# Patient Record
Sex: MALE | Race: WHITE
[De-identification: names, ages, dates, MRNs, and addresses within clinical notes are randomized per-mention and may not be internally consistent; named-entity substitution may affect disease eponyms.]

---

## 2022-12-31 ENCOUNTER — HOSPITAL ENCOUNTER (EMERGENCY)
Dept: HOSPITAL 75 - ER | Age: 57
Discharge: HOME | End: 2022-12-31
Payer: COMMERCIAL

## 2022-12-31 VITALS — WEIGHT: 220.46 LBS | HEIGHT: 74.02 IN | BODY MASS INDEX: 28.29 KG/M2

## 2022-12-31 VITALS — SYSTOLIC BLOOD PRESSURE: 148 MMHG | DIASTOLIC BLOOD PRESSURE: 106 MMHG
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
## 2022-12-31 DIAGNOSIS — S61.011A: Primary | ICD-10-CM

## 2022-12-31 DIAGNOSIS — Z28.310: ICD-10-CM

## 2022-12-31 DIAGNOSIS — Z23: ICD-10-CM
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
## 2022-12-31 PROCEDURE — 90471 IMMUNIZATION ADMIN: CPT

## 2022-12-31 PROCEDURE — 90715 TDAP VACCINE 7 YRS/> IM: CPT

## 2022-12-31 NOTE — ED UPPER EXTREMITY
General


Stated Complaint:  RIGHT THUMB LAC


Source:  patient


Exam Limitations:  no limitations





History of Present Illness


Date Seen by Provider:  Dec 31, 2022


Time Seen by Provider:  19:13


Initial Comments


56 y/o male presents tonight with c/o laceration to right thumb.  Pt states he 

accidentally cut himself while sharpening a knife at work.  He works at [employer].  Injury occurred just prior to arrival.  He denies any other injuries.

He is uncertain when his last Tdap vaccine was.


Onset:  just prior to arrival


Pain/Injury Location:  right thumb


Method of Injury:  other (sharping a knife)


Associated Symptoms:  denies





Allergies and Home Medications


Allergies


Coded Allergies:  


     No Known Drug Allergies (Unverified , 12/31/22)





Patient Home Medication List


Home Medication List Reviewed:  Yes





Review of Systems


Constitutional:  no symptoms reported


Respiratory:  no symptoms reported


Cardiovascular:  no symptoms reported


Musculoskeletal:  no symptoms reported


Skin:  other (right thumb laceration)


Psychiatric/Neurological:  No Symptoms Reported





Past Medical-Social-Family Hx


Patient Social History


Tobacco Use?:  No


Substance use?:  No


Alcohol Use?:  No





Physical Exam


Vital Signs


Capillary Refill :


Height, Weight, BMI


Height: '"


Weight: lbs. oz. kg;  BMI


Method:


General Appearance:  WD/WN, no apparent distress


Wrist:  Yes normal inspection, Yes non-tender, Yes no evidence of injury


Hand:  non-tender, normal ROM, laceration (below cuticle of right thumb,1.5cm C 

shaped)


Neurologic/Tendon:  normal sensation, normal motor functions, normal tendon 

functions, responds to pain, no evidence tendon injury


Neurologic/Psychiatric:  no motor/sensory deficits, alert, normal mood/affect, 

oriented x 3


Skin:  normal color, warm/dry





Procedures/Interventions





   Wound Location:  Upper Extremities (right thumb)


   Wound Length (cm):  1.5


   Wound's Depth, Shape:  superficial, flap


   Wound Explored:  clean


   Irrigated w/ Saline (ccs):  30


   Betadine Prep?:  No


   Other Closure Supply:  Wound Adhesive


   Sterile Dressing Applied?:  Yes





Progress/Results/Core Measures


Results/Orders


My Orders





Orders - JEAN CLAUDE WALLER APRN


Dipht,Pertuss([facility]),Tet Adult ([facility] (12/31/22 19:30)








Departure


Impression





   Primary Impression:  


   Laceration of right thumb without foreign body without damage to nail


Disposition:  01 HOME, SELF-CARE


Condition:  Stable





Departure-Patient Inst.


Decision time for Depature:  19:26


Referrals:  


NO,LOCAL PHYSICIAN (PCP/Family)


Primary Care Physician


Patient Instructions:  Laceration Repair With Glue (DC)





Add. Discharge Instructions:  


Do not get wet for 24 hours, then wash with soap and water.  Keep clean and dry.

 Monitor for any signs of infection, follow up if any problems arise.











JEAN CLAUDE WALLER APRN         Dec 31, 2022 19:13
[employer]

## 2023-06-14 ENCOUNTER — APPOINTMENT (RX ONLY)
Dept: URBAN - METROPOLITAN AREA CLINIC 51 | Facility: CLINIC | Age: 58
Setting detail: DERMATOLOGY
End: 2023-06-14

## 2023-06-14 DIAGNOSIS — L30.0 NUMMULAR DERMATITIS: ICD-10-CM | Status: WORSENING

## 2023-06-14 PROCEDURE — 99203 OFFICE O/P NEW LOW 30 MIN: CPT

## 2023-06-14 PROCEDURE — ? PRESCRIPTION

## 2023-06-14 PROCEDURE — ? COUNSELING

## 2023-06-14 RX ORDER — TRIAMCINOLONE ACETONIDE 1 MG/G
CREAM TOPICAL
Qty: 80 | Refills: 4 | Status: ERX | COMMUNITY
Start: 2023-06-14

## 2023-06-14 RX ADMIN — TRIAMCINOLONE ACETONIDE: 1 CREAM TOPICAL at 00:00

## 2023-06-14 ASSESSMENT — LOCATION DETAILED DESCRIPTION DERM
LOCATION DETAILED: RIGHT LATERAL SUPERIOR CHEST
LOCATION DETAILED: LEFT VENTRAL DISTAL FOREARM

## 2023-06-14 ASSESSMENT — BSA RASH: BSA RASH: 1

## 2023-06-14 ASSESSMENT — LOCATION SIMPLE DESCRIPTION DERM
LOCATION SIMPLE: LEFT FOREARM
LOCATION SIMPLE: CHEST

## 2023-06-14 ASSESSMENT — LOCATION ZONE DERM
LOCATION ZONE: ARM
LOCATION ZONE: TRUNK

## 2023-06-14 ASSESSMENT — SEVERITY ASSESSMENT: SEVERITY: MILD

## 2023-06-14 ASSESSMENT — ITCH NUMERIC RATING SCALE: HOW SEVERE IS YOUR ITCHING?: 2

## 2025-06-19 ENCOUNTER — APPOINTMENT (OUTPATIENT)
Age: 60
Setting detail: DERMATOLOGY
End: 2025-06-19

## 2025-06-19 DIAGNOSIS — L57.8 OTHER SKIN CHANGES DUE TO CHRONIC EXPOSURE TO NONIONIZING RADIATION: ICD-10-CM | Status: STABLE

## 2025-06-19 DIAGNOSIS — L82.1 OTHER SEBORRHEIC KERATOSIS: ICD-10-CM | Status: STABLE

## 2025-06-19 PROCEDURE — ? COUNSELING

## 2025-06-19 PROCEDURE — ? SUNSCREEN RECOMMENDATIONS

## 2025-06-19 ASSESSMENT — LOCATION SIMPLE DESCRIPTION DERM
LOCATION SIMPLE: LEFT FOREHEAD
LOCATION SIMPLE: RIGHT UPPER BACK
LOCATION SIMPLE: LEFT UPPER BACK

## 2025-06-19 ASSESSMENT — LOCATION DETAILED DESCRIPTION DERM
LOCATION DETAILED: RIGHT SUPERIOR LATERAL UPPER BACK
LOCATION DETAILED: LEFT SUPERIOR UPPER BACK
LOCATION DETAILED: LEFT FOREHEAD
LOCATION DETAILED: RIGHT SUPERIOR UPPER BACK

## 2025-06-19 ASSESSMENT — LOCATION ZONE DERM
LOCATION ZONE: TRUNK
LOCATION ZONE: FACE

## 2025-06-19 NOTE — HPI: SKIN LESION
Is This A New Presentation, Or A Follow-Up?: Skin Lesion
How Severe Is Your Skin Lesion?: mild
Has Your Skin Lesion Been Treated?: not been treated
Additional History: Patient here for spot and skin check.